# Patient Record
Sex: MALE | Race: WHITE | NOT HISPANIC OR LATINO
[De-identification: names, ages, dates, MRNs, and addresses within clinical notes are randomized per-mention and may not be internally consistent; named-entity substitution may affect disease eponyms.]

---

## 2021-10-18 PROBLEM — Z00.00 ENCOUNTER FOR PREVENTIVE HEALTH EXAMINATION: Status: ACTIVE | Noted: 2021-10-18

## 2021-10-19 ENCOUNTER — APPOINTMENT (OUTPATIENT)
Dept: NEUROLOGY | Facility: CLINIC | Age: 31
End: 2021-10-19
Payer: MEDICAID

## 2021-10-19 VITALS
SYSTOLIC BLOOD PRESSURE: 154 MMHG | DIASTOLIC BLOOD PRESSURE: 97 MMHG | BODY MASS INDEX: 39.1 KG/M2 | HEART RATE: 86 BPM | WEIGHT: 264 LBS | TEMPERATURE: 97.4 F | HEIGHT: 69 IN

## 2021-10-19 DIAGNOSIS — M79.10 MYALGIA, UNSPECIFIED SITE: ICD-10-CM

## 2021-10-19 DIAGNOSIS — M62.89 OTHER SPECIFIED DISORDERS OF MUSCLE: ICD-10-CM

## 2021-10-19 DIAGNOSIS — Z87.09 PERSONAL HISTORY OF OTHER DISEASES OF THE RESPIRATORY SYSTEM: ICD-10-CM

## 2021-10-19 PROCEDURE — 99204 OFFICE O/P NEW MOD 45 MIN: CPT

## 2021-10-20 PROBLEM — Z87.09 HISTORY OF ASTHMA: Status: RESOLVED | Noted: 2021-10-19 | Resolved: 2021-10-20

## 2021-10-20 PROBLEM — M62.89 MUSCLE TIGHTNESS: Status: ACTIVE | Noted: 2021-10-19

## 2021-10-20 NOTE — PHYSICAL EXAM
[FreeTextEntry1] : \par  Mental Status: alert and oriented to person. Thinks name of hospital is [facility]. Does not know the month. Knows the year. Able to name hand, wrist, thumb. Difficulty following commands across midline. \par Thinks 4 quarters in $1.50. 3/3 registration of 3 items, 3/3 recall. Childlike with sing-song like quality to voice. [place] thought process. Limited verbal fluency. Keeps saying "you got it, you got it". Poor historian. \par CN: visual acuity, VFF, blink to confrontation \par III, IV, VI, PERRL, EOMI \par V sensation normal to light touch, pinprick\par VII normal squint vs resistance, normal smile, face symmetric \par VIII: normal hearing \par IX, X normal gag, symmetric palate, uvula raises midline \par XI normal shrug versus resistance and lateralization of head versus resistance \par XII tongue symmetric, normal strength, no tremor  \par Motor: full strength \par Sensory: normal to LT\par Reflexes \par Brachioradialis, biceps, triceps, patella and ankles 1+ \par Plantar flexor response bilaterally \par Coordination: no dysmetria on FNF, difficulty with rapid alternating movements , difficulty getting onto the bench, reports feeling very unsteady \par Gait : tends to veer to the left when walking \par \par

## 2021-10-20 NOTE — ASSESSMENT
[FreeTextEntry1] : Physical therapy \par \par Labwork: Will recheck CK, DONALD, CMP, CRP, magnesium, ESR, vitamin b12, vitamin D \par \par Return to clinic in 2 months \par \par \par \par

## 2021-10-20 NOTE — DISCUSSION/SUMMARY
[FreeTextEntry1] : Jefe is a pleasant 31-year-old young man with a history of Autism spectrum disorder, bipolar disorder NOS, cognitive disability - mild, developmental delay. He is presenting to clinic due to increased sense of incoordination that appears to have been occurring for past one year. CT head with left yumiko cisterna magna? on imaging - will review with radiology --> was not called on official report. Likely developmental and incidental finding. Low suspicion for acute neurologic process or myalgias. He had great difficulty getting up on the examining table, had sensation he was going to fall. He had difficulty with fine motor coordination and tended to veer to the left when walking. Possible this could be medication side-effect, unclear. \par \par Doubt acute neurologic process. CT head negative - MRI brain would likely require sedation --> unclear it is worth the risk. Would benefit greatly from physical therapy. \par \par \par \par

## 2021-10-20 NOTE — HISTORY OF PRESENT ILLNESS
[FreeTextEntry1] : Osman Mayberry is a 31-year-old man with a history of pervasive developmental delay, asthma, bipolar disorder NOS, cognitive disability- mild, autism spectrum, adjustment disorder, developmental delay, cognitive disability  who presented to [facility] on 10/18/21 with report of muscle pain and incoordination and headache. Symptoms of muscle pain and incoordination have been happening for one year. He feels that his movements are not coordinated, not listening to his brain. He was seen in the ED at [facility] numerous times for this and was referred for hospital follow-up. He does not have a neurologist. His primary care physician is Dr. Long.  \par As per patient’s report, his muscle and brain are not coordinated. He has developmental delay and is a limited historian. He continues to say “got it, got it”. He reports that he almost falls but has not fallen. It is unclear if it is pain he feels or loss of coordination. He says this has been occurring for the past one year.\par His psychiatrist is Dr. Cooper. He has been on numerous medications including Depakote which caused many side effects including worsening agitation, depression, anxiety, anger. \par \par Upon arrival to the ED, his blood pressure was 157/97. He was afebrile. CK was 102. \par \par 10/9/21:\par COMPARISON: 10/2/2020\par \par FINDINGS: The CT examination demonstrates the ventricles, cisternal spaces,\par and cortical sulci to be within normal limits. There is no midline shift or\par extra axial collections. The gray white differentiation appears within normal\par limits. There is no intracranial hemorrhage or acute transcortical infarct.\par The bony windows demonstrates no fractures. The visualized paranasal sinuses\par are within normal limits. The mastoid air cells are well aerated.\par \par IMPRESSION: No acute intracranial abnormality. ? yumiko cisternal magna on my read ? left cerebellum \par Allergies: dust, seasonal allergies \par Medications: \par Doxycycline 100 mg bid (2 week course) \par Olanzapine 10 mg daily prn \par Sertraline 25 mg qd \par Cetirizine 10 mg qhs \par Cogentin \par Prozac 30 mg daily \par Vitamin D \par Pantoprazole \par Methylfolate \par Vitamin c, vitamin d \par

## 2021-10-20 NOTE — CONSULT LETTER
[Dear  ___] : Dear  [unfilled], [Please see my note below.] : Please see my note below. [Sincerely,] : Sincerely, [FreeTextEntry3] : Evelina Gray MD \par [facility]

## 2021-10-20 NOTE — REASON FOR VISIT
[Consultation] : a consultation visit [Parent] : parent [FreeTextEntry1] : Hospital Follow up, Muscle pain and tightness.

## 2021-10-27 DIAGNOSIS — E55.9 VITAMIN D DEFICIENCY, UNSPECIFIED: ICD-10-CM

## 2021-10-27 LAB
25(OH)D3 SERPL-MCNC: 25 NG/ML
ALBUMIN SERPL ELPH-MCNC: 4.7 G/DL
ALDOSTERONE SERUM: 8.9 NG/DL
ALP BLD-CCNC: 101 U/L
ALT SERPL-CCNC: 35 U/L
ANA SER IF-ACNC: NEGATIVE
ANION GAP SERPL CALC-SCNC: 14 MMOL/L
AST SERPL-CCNC: 21 U/L
BILIRUB SERPL-MCNC: 0.2 MG/DL
BUN SERPL-MCNC: 14 MG/DL
CALCIUM SERPL-MCNC: 9.7 MG/DL
CHLORIDE SERPL-SCNC: 104 MMOL/L
CK SERPL-CCNC: 100 U/L
CO2 SERPL-SCNC: 25 MMOL/L
CREAT SERPL-MCNC: 0.97 MG/DL
CRP SERPL-MCNC: 4 MG/L
ERYTHROCYTE [SEDIMENTATION RATE] IN BLOOD BY WESTERGREN METHOD: 11 MM/HR
GLUCOSE SERPL-MCNC: 88 MG/DL
MAGNESIUM SERPL-MCNC: 1.7 MG/DL
POTASSIUM SERPL-SCNC: 4.4 MMOL/L
PROT SERPL-MCNC: 7.2 G/DL
SODIUM SERPL-SCNC: 142 MMOL/L
VIT B12 SERPL-MCNC: 607 PG/ML

## 2021-10-27 RX ORDER — ERGOCALCIFEROL 1.25 MG/1
1.25 MG CAPSULE, LIQUID FILLED ORAL
Qty: 6 | Refills: 0 | Status: ACTIVE | COMMUNITY
Start: 2021-10-27 | End: 1900-01-01

## 2021-10-31 ENCOUNTER — RX RENEWAL (OUTPATIENT)
Age: 31
End: 2021-10-31

## 2021-11-06 ENCOUNTER — RX RENEWAL (OUTPATIENT)
Age: 31
End: 2021-11-06

## 2021-12-08 ENCOUNTER — APPOINTMENT (OUTPATIENT)
Dept: NEUROLOGY | Facility: CLINIC | Age: 31
End: 2021-12-08

## 2022-02-23 ENCOUNTER — APPOINTMENT (OUTPATIENT)
Dept: NEUROLOGY | Facility: CLINIC | Age: 32
End: 2022-02-23
Payer: MEDICARE

## 2022-02-23 DIAGNOSIS — Z74.09 OTHER REDUCED MOBILITY: ICD-10-CM

## 2022-02-23 PROCEDURE — 99213 OFFICE O/P EST LOW 20 MIN: CPT | Mod: 95

## 2022-02-23 NOTE — ASSESSMENT
[FreeTextEntry1] : Recommend physical therapy 2-3 times a week for balance and gait \par Continue daily vitamin D \par CRISTINA lino - follow-up in 3 months

## 2022-02-23 NOTE — PHYSICAL EXAM
[FreeTextEntry1] : Telehealth visit: \par  Mental Status: alert and oriented to person. Knows the month, day and the year. Knows the president. Perseverative with limited expressive speech. Keeps saying "got it, got it, if you don't got it will call the police".  Unable to calculate number of quarters in $1.50. Unable to spell world. Speech impediment, mild dysarthria. Does not know the month. Knows the year. Able to name hand, wrist, thumb. Difficulty following commands Childlike demeanor. \par Gait : Able to walk without falling, slightly wide based. Somewhat clumsy, appearing \par

## 2022-02-23 NOTE — DISCUSSION/SUMMARY
[FreeTextEntry1] : Jefe is a pleasant 31-year-old young man with a history of Autism spectrum disorder, bipolar disorder NOS, cognitive disability - mild, developmental delay. He is presenting to clinic due to increased sense of incoordination that appears to have been occurring for past one year. CT head with left yumiko cisterna magna? on imaging - will review with radiology --> was not called on official report. Likely developmental and incidental finding. Low suspicion for acute neurologic process or myalgias. He had great difficulty getting up on the examining table, had sensation he was going to fall. He had difficulty with fine motor coordination and tended to veer to the left when walking. Possible this could be medication side-effect, unclear. \par \par Doubt acute neurologic process. CT head negative - MRI brain would likely require sedation --> unclear it is worth the risk. Would benefit greatly from physical therapy. Labs measuring muscle function were unrevealing (normal CPK). He is resistant but think he would benefit from continued physical therapy. \par Low vitamin D, should continue vitamin D 1000 units. \par \par

## 2022-02-23 NOTE — HISTORY OF PRESENT ILLNESS
[Home] : at home, [unfilled] , at the time of the visit. [Medical Office: ([facility])___] : at the medical office located in  [Parents] : parents [FreeTextEntry1] : Last seen in clinic in October 2021. Reports that his muscles are still not listening to his brain. He feels uncoordinated. He is going to physical therapy but doesn't want to continue to go. He wants a medicine that will help him to be coordinated. Asked if he should speak with his psychiatrist about this. \par \par \par \par \par Osman Mayberry is a 31-year-old man with a history of pervasive developmental delay, asthma, bipolar disorder NOS, cognitive disability- mild, autism spectrum, adjustment disorder, developmental delay, cognitive disability  who presented to [facility] on 10/18/21 with report of muscle pain and incoordination and headache. Symptoms of muscle pain and incoordination have been happening for one year. He feels that his movements are not coordinated, not listening to his brain. He was seen in the ED at [facility] numerous times for this and was referred for hospital follow-up. He does not have a neurologist. His primary care physician is Dr. Long.  \par As per patient’s report, his muscle and brain are not coordinated. He has developmental delay and is a limited historian. He continues to say “got it, got it”. He reports that he almost falls but has not fallen. It is unclear if it is pain he feels or loss of coordination. He says this has been occurring for the past one year.\par His psychiatrist is Dr. Cooper. He has been on numerous medications including Depakote which caused many side effects including worsening agitation, depression, anxiety, anger. \par \par Upon arrival to the ED, his blood pressure was 157/97. He was afebrile. CK was 102. \par \par 10/9/21:\par COMPARISON: 10/2/2020\par \par FINDINGS: The CT examination demonstrates the ventricles, cisternal spaces,\par and cortical sulci to be within normal limits. There is no midline shift or\par extra axial collections. The gray white differentiation appears within normal\par limits. There is no intracranial hemorrhage or acute transcortical infarct.\par The bony windows demonstrates no fractures. The visualized paranasal sinuses\par are within normal limits. The mastoid air cells are well aerated.\par \par IMPRESSION: No acute intracranial abnormality. ? yumiko cisternal magna on my read ? left cerebellum \par Allergies: dust, seasonal allergies \par Medications: \par Doxycycline 100 mg bid (2 week course) \par Olanzapine 10 mg daily prn \par Sertraline 25 mg qd \par Cetirizine 10 mg qhs \par Cogentin \par Prozac 30 mg daily \par Vitamin D \par Pantoprazole \par Methylfolate \par Vitamin c, vitamin d \par  [FreeTextEntry3] : Verbal consent from mom

## 2022-03-02 ENCOUNTER — RX RENEWAL (OUTPATIENT)
Age: 32
End: 2022-03-02

## 2022-03-15 ENCOUNTER — RX RENEWAL (OUTPATIENT)
Age: 32
End: 2022-03-15

## 2022-04-08 ENCOUNTER — RX RENEWAL (OUTPATIENT)
Age: 32
End: 2022-04-08

## 2022-04-10 ENCOUNTER — RX RENEWAL (OUTPATIENT)
Age: 32
End: 2022-04-10

## 2022-05-11 ENCOUNTER — RX RENEWAL (OUTPATIENT)
Age: 32
End: 2022-05-11

## 2022-11-17 ENCOUNTER — RX RENEWAL (OUTPATIENT)
Age: 32
End: 2022-11-17

## 2022-11-17 RX ORDER — MULTIVIT-MIN/FOLIC/VIT K/LYCOP 400-300MCG
25 MCG TABLET ORAL
Qty: 30 | Refills: 5 | Status: ACTIVE | COMMUNITY
Start: 2022-02-23 | End: 1900-01-01